# Patient Record
Sex: MALE | Race: WHITE
[De-identification: names, ages, dates, MRNs, and addresses within clinical notes are randomized per-mention and may not be internally consistent; named-entity substitution may affect disease eponyms.]

---

## 2019-11-27 ENCOUNTER — HOSPITAL ENCOUNTER (INPATIENT)
Dept: HOSPITAL 8 - ED | Age: 54
LOS: 3 days | Discharge: HOME | DRG: 246 | End: 2019-11-30
Attending: INTERNAL MEDICINE | Admitting: HOSPITALIST
Payer: COMMERCIAL

## 2019-11-27 VITALS — BODY MASS INDEX: 27.76 KG/M2 | WEIGHT: 216.27 LBS | HEIGHT: 74 IN

## 2019-11-27 VITALS — DIASTOLIC BLOOD PRESSURE: 88 MMHG | SYSTOLIC BLOOD PRESSURE: 131 MMHG

## 2019-11-27 DIAGNOSIS — Z87.891: ICD-10-CM
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
## 2019-11-27 DIAGNOSIS — E87.2: ICD-10-CM
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
## 2019-11-27 DIAGNOSIS — I25.2: ICD-10-CM

## 2019-11-27 DIAGNOSIS — Z82.49: ICD-10-CM

## 2019-11-27 DIAGNOSIS — I49.01: ICD-10-CM

## 2019-11-27 DIAGNOSIS — N17.9: ICD-10-CM

## 2019-11-27 DIAGNOSIS — I49.02: ICD-10-CM

## 2019-11-27 DIAGNOSIS — I46.2: ICD-10-CM

## 2019-11-27 DIAGNOSIS — Z82.0: ICD-10-CM

## 2019-11-27 DIAGNOSIS — K72.00: ICD-10-CM

## 2019-11-27 DIAGNOSIS — I21.09: ICD-10-CM

## 2019-11-27 DIAGNOSIS — E78.5: ICD-10-CM

## 2019-11-27 DIAGNOSIS — T82.867A: Primary | ICD-10-CM

## 2019-11-27 DIAGNOSIS — I25.10: ICD-10-CM

## 2019-11-27 DIAGNOSIS — R73.9: ICD-10-CM

## 2019-11-27 LAB
<PLATELET ESTIMATE>: ADEQUATE
<PLT MORPHOLOGY>: (no result)
ALBUMIN SERPL-MCNC: 3.9 G/DL (ref 3.4–5)
ALP SERPL-CCNC: 66 U/L (ref 45–117)
ALT SERPL-CCNC: 341 U/L (ref 12–78)
ANION GAP SERPL CALC-SCNC: 16 MMOL/L (ref 5–15)
APTT BLD: 26 SECONDS (ref 25–31)
BILIRUB SERPL-MCNC: 1.1 MG/DL (ref 0.2–1)
CALCIUM SERPL-MCNC: 8.7 MG/DL (ref 8.5–10.1)
CHLORIDE SERPL-SCNC: 108 MMOL/L (ref 98–107)
CREAT SERPL-MCNC: 1.56 MG/DL (ref 0.7–1.3)
EOS#(MANUAL): 0.08 X10^3/UL (ref 0–0.4)
EOS% (MANUAL): 1 % (ref 1–7)
ERYTHROCYTE [DISTWIDTH] IN BLOOD BY AUTOMATED COUNT: 13.3 % (ref 9.4–14.8)
INR PPP: 1.05 (ref 0.93–1.1)
LYMPH#(MANUAL): 5.06 X10^3/UL (ref 1–3.4)
LYMPHS% (MANUAL): 64 % (ref 22–44)
MCH RBC QN AUTO: 32.1 PG (ref 27.5–34.5)
MCHC RBC AUTO-ENTMCNC: 33.3 G/DL (ref 33.2–36.2)
MCV RBC AUTO: 96.6 FL (ref 81–97)
MD: YES
MONOS#(MANUAL): 0.16 X10^3/UL (ref 0.3–2.7)
MONOS% (MANUAL): 2 % (ref 2–9)
O2 FLOW: 10 L/MIN
PLATELET # BLD AUTO: 200 X10^3/UL (ref 130–400)
PMV BLD AUTO: 8.2 FL (ref 7.4–10.4)
POLYCHROMASIA BLD QL SMEAR: (no result)
PROT SERPL-MCNC: 7.3 G/DL (ref 6.4–8.2)
PROTHROMBIN TIME: 11 SECONDS (ref 9.6–11.5)
RBC # BLD AUTO: 5.01 X10^6/UL (ref 4.38–5.82)
REACTIVE LYMPHS # (MANUAL): 1.98 X10^3/UL (ref 0–0)
REACTIVE LYMPHS % (MANUAL): 25 % (ref 0–0)
SEG#(MANUAL): 0.63 X10^3/UL (ref 1.8–6.8)
SEGS% (MANUAL): 8 % (ref 42–75)
TROPONIN I SERPL-MCNC: 5.28 NG/ML (ref 0–0.04)
TROPONIN I SERPL-MCNC: 7.5 NG/ML (ref 0–0.04)

## 2019-11-27 PROCEDURE — 85730 THROMBOPLASTIN TIME PARTIAL: CPT

## 2019-11-27 PROCEDURE — 99157 MOD SED OTHER PHYS/QHP EA: CPT

## 2019-11-27 PROCEDURE — 0399T: CPT

## 2019-11-27 PROCEDURE — C1760 CLOSURE DEV, VASC: HCPCS

## 2019-11-27 PROCEDURE — 80053 COMPREHEN METABOLIC PANEL: CPT

## 2019-11-27 PROCEDURE — 85610 PROTHROMBIN TIME: CPT

## 2019-11-27 PROCEDURE — 82803 BLOOD GASES ANY COMBINATION: CPT

## 2019-11-27 PROCEDURE — 85025 COMPLETE CBC W/AUTO DIFF WBC: CPT

## 2019-11-27 PROCEDURE — 36415 COLL VENOUS BLD VENIPUNCTURE: CPT

## 2019-11-27 PROCEDURE — 87081 CULTURE SCREEN ONLY: CPT

## 2019-11-27 PROCEDURE — C1769 GUIDE WIRE: HCPCS

## 2019-11-27 PROCEDURE — 4A023N7 MEASUREMENT OF CARDIAC SAMPLING AND PRESSURE, LEFT HEART, PERCUTANEOUS APPROACH: ICD-10-PCS | Performed by: INTERNAL MEDICINE

## 2019-11-27 PROCEDURE — 99156 MOD SED OTH PHYS/QHP 5/>YRS: CPT

## 2019-11-27 PROCEDURE — 84100 ASSAY OF PHOSPHORUS: CPT

## 2019-11-27 PROCEDURE — 80048 BASIC METABOLIC PNL TOTAL CA: CPT

## 2019-11-27 PROCEDURE — B2151ZZ FLUOROSCOPY OF LEFT HEART USING LOW OSMOLAR CONTRAST: ICD-10-PCS | Performed by: INTERNAL MEDICINE

## 2019-11-27 PROCEDURE — 93306 TTE W/DOPPLER COMPLETE: CPT

## 2019-11-27 PROCEDURE — 93458 L HRT ARTERY/VENTRICLE ANGIO: CPT

## 2019-11-27 PROCEDURE — 36600 WITHDRAWAL OF ARTERIAL BLOOD: CPT

## 2019-11-27 PROCEDURE — 93005 ELECTROCARDIOGRAM TRACING: CPT

## 2019-11-27 PROCEDURE — 83735 ASSAY OF MAGNESIUM: CPT

## 2019-11-27 PROCEDURE — 84484 ASSAY OF TROPONIN QUANT: CPT

## 2019-11-27 PROCEDURE — 5A2204Z RESTORATION OF CARDIAC RHYTHM, SINGLE: ICD-10-PCS | Performed by: EMERGENCY MEDICINE

## 2019-11-27 PROCEDURE — C1725 CATH, TRANSLUMIN NON-LASER: HCPCS

## 2019-11-27 PROCEDURE — 83605 ASSAY OF LACTIC ACID: CPT

## 2019-11-27 PROCEDURE — B2111ZZ FLUOROSCOPY OF MULTIPLE CORONARY ARTERIES USING LOW OSMOLAR CONTRAST: ICD-10-PCS | Performed by: INTERNAL MEDICINE

## 2019-11-27 PROCEDURE — 80061 LIPID PANEL: CPT

## 2019-11-27 PROCEDURE — C1894 INTRO/SHEATH, NON-LASER: HCPCS

## 2019-11-27 PROCEDURE — C1887 CATHETER, GUIDING: HCPCS

## 2019-11-27 PROCEDURE — 027034Z DILATION OF CORONARY ARTERY, ONE ARTERY WITH DRUG-ELUTING INTRALUMINAL DEVICE, PERCUTANEOUS APPROACH: ICD-10-PCS | Performed by: INTERNAL MEDICINE

## 2019-11-27 PROCEDURE — 5A12012 PERFORMANCE OF CARDIAC OUTPUT, SINGLE, MANUAL: ICD-10-PCS | Performed by: EMERGENCY MEDICINE

## 2019-11-27 PROCEDURE — C1874 STENT, COATED/COV W/DEL SYS: HCPCS

## 2019-11-27 RX ADMIN — AMIODARONE HYDROCHLORIDE PRN MLS/HR: 50 INJECTION, SOLUTION INTRAVENOUS at 22:30
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
## 2019-11-28 LAB
ANION GAP SERPL CALC-SCNC: 8 MMOL/L (ref 5–15)
BASOPHILS # BLD AUTO: 0.02 X10^3/UL (ref 0–0.1)
BASOPHILS NFR BLD AUTO: 0 % (ref 0–1)
CALCIUM SERPL-MCNC: 8.6 MG/DL (ref 8.5–10.1)
CHLORIDE SERPL-SCNC: 111 MMOL/L (ref 98–107)
CREAT SERPL-MCNC: 1.15 MG/DL (ref 0.7–1.3)
EOSINOPHIL # BLD AUTO: 0.01 X10^3/UL (ref 0–0.4)
EOSINOPHIL NFR BLD AUTO: 0 % (ref 1–7)
ERYTHROCYTE [DISTWIDTH] IN BLOOD BY AUTOMATED COUNT: 13.3 % (ref 9.4–14.8)
LYMPHOCYTES # BLD AUTO: 1.11 X10^3/UL (ref 1–3.4)
LYMPHOCYTES NFR BLD AUTO: 15 % (ref 22–44)
MCH RBC QN AUTO: 31.8 PG (ref 27.5–34.5)
MCHC RBC AUTO-ENTMCNC: 33 G/DL (ref 33.2–36.2)
MCV RBC AUTO: 96.3 FL (ref 81–97)
MD: NO
MONOCYTES # BLD AUTO: 0.58 X10^3/UL (ref 0.2–0.8)
MONOCYTES NFR BLD AUTO: 8 % (ref 2–9)
NEUTROPHILS # BLD AUTO: 5.7 X10^3/UL (ref 1.8–6.8)
NEUTROPHILS NFR BLD AUTO: 77 % (ref 42–75)
PLATELET # BLD AUTO: 182 X10^3/UL (ref 130–400)
PMV BLD AUTO: 8.4 FL (ref 7.4–10.4)
RBC # BLD AUTO: 4.52 X10^6/UL (ref 4.38–5.82)
TROPONIN I SERPL-MCNC: 22.8 NG/ML (ref 0–0.04)
TROPONIN I SERPL-MCNC: 26.6 NG/ML (ref 0–0.04)

## 2019-11-28 RX ADMIN — SODIUM CHLORIDE AND POTASSIUM CHLORIDE SCH MLS/HR: 9; 2.98 INJECTION, SOLUTION INTRAVENOUS at 00:51

## 2019-11-28 RX ADMIN — AMIODARONE HYDROCHLORIDE PRN MLS/HR: 50 INJECTION, SOLUTION INTRAVENOUS at 03:20

## 2019-11-28 RX ADMIN — SODIUM CHLORIDE AND POTASSIUM CHLORIDE SCH MLS/HR: 9; 2.98 INJECTION, SOLUTION INTRAVENOUS at 09:12

## 2019-11-28 RX ADMIN — SODIUM CHLORIDE SCH MLS/HR: 0.9 INJECTION, SOLUTION INTRAVENOUS at 09:12

## 2019-11-28 RX ADMIN — SODIUM CHLORIDE SCH MLS/HR: 0.9 INJECTION, SOLUTION INTRAVENOUS at 00:12

## 2019-11-28 RX ADMIN — METOPROLOL TARTRATE SCH MG: 25 TABLET, FILM COATED ORAL at 08:30

## 2019-11-28 RX ADMIN — METOPROLOL TARTRATE SCH MG: 25 TABLET, FILM COATED ORAL at 17:56

## 2019-11-28 RX ADMIN — TICAGRELOR SCH MG: 90 TABLET ORAL at 19:37

## 2019-11-28 RX ADMIN — LISINOPRIL SCH MG: 5 TABLET ORAL at 09:18

## 2019-11-28 RX ADMIN — ASPIRIN SCH MG: 81 TABLET, COATED ORAL at 05:56

## 2019-11-28 RX ADMIN — LISINOPRIL SCH MG: 5 TABLET ORAL at 19:37

## 2019-11-28 RX ADMIN — TICAGRELOR SCH MG: 90 TABLET ORAL at 09:19

## 2019-11-29 VITALS — DIASTOLIC BLOOD PRESSURE: 82 MMHG | SYSTOLIC BLOOD PRESSURE: 135 MMHG

## 2019-11-29 VITALS — SYSTOLIC BLOOD PRESSURE: 110 MMHG | DIASTOLIC BLOOD PRESSURE: 71 MMHG

## 2019-11-29 LAB
ALBUMIN SERPL-MCNC: 3.5 G/DL (ref 3.4–5)
ALP SERPL-CCNC: 58 U/L (ref 45–117)
ALT SERPL-CCNC: 260 U/L (ref 12–78)
ANION GAP SERPL CALC-SCNC: 6 MMOL/L (ref 5–15)
BILIRUB SERPL-MCNC: 0.6 MG/DL (ref 0.2–1)
CALCIUM SERPL-MCNC: 8.5 MG/DL (ref 8.5–10.1)
CHLORIDE SERPL-SCNC: 113 MMOL/L (ref 98–107)
CHOL/HDL RATIO: 2.7
CREAT SERPL-MCNC: 1.12 MG/DL (ref 0.7–1.3)
HDL CHOL %: 37 % (ref 26–37)
HDL CHOLESTEROL (DIRECT): 37 MG/DL (ref 40–60)
LDL CHOLESTEROL,CALCULATED: 47 MG/DL (ref 54–169)
LDLC/HDLC SERPL: 1.3 {RATIO} (ref 0.5–3)
PROT SERPL-MCNC: 6.9 G/DL (ref 6.4–8.2)
TRIGL SERPL-MCNC: 84 MG/DL (ref 50–200)
TROPONIN I SERPL-MCNC: 13.6 NG/ML (ref 0–0.04)
VLDLC SERPL CALC-MCNC: 17 MG/DL (ref 0–25)

## 2019-11-29 RX ADMIN — TICAGRELOR SCH MG: 90 TABLET ORAL at 20:47

## 2019-11-29 RX ADMIN — ASPIRIN SCH MG: 81 TABLET, COATED ORAL at 06:06

## 2019-11-29 RX ADMIN — LISINOPRIL SCH MG: 5 TABLET ORAL at 08:38

## 2019-11-29 RX ADMIN — METOPROLOL TARTRATE SCH MG: 25 TABLET, FILM COATED ORAL at 18:00

## 2019-11-29 RX ADMIN — TICAGRELOR SCH MG: 90 TABLET ORAL at 08:38

## 2019-11-29 RX ADMIN — METOPROLOL TARTRATE SCH MG: 25 TABLET, FILM COATED ORAL at 06:00

## 2019-11-29 RX ADMIN — LISINOPRIL SCH MG: 5 TABLET ORAL at 20:47

## 2019-11-30 VITALS — SYSTOLIC BLOOD PRESSURE: 114 MMHG | DIASTOLIC BLOOD PRESSURE: 78 MMHG

## 2019-11-30 VITALS — SYSTOLIC BLOOD PRESSURE: 106 MMHG | DIASTOLIC BLOOD PRESSURE: 64 MMHG

## 2019-11-30 RX ADMIN — TICAGRELOR SCH MG: 90 TABLET ORAL at 09:07

## 2019-11-30 RX ADMIN — METOPROLOL TARTRATE SCH MG: 25 TABLET, FILM COATED ORAL at 06:00

## 2019-11-30 RX ADMIN — LISINOPRIL SCH MG: 5 TABLET ORAL at 09:07

## 2019-11-30 RX ADMIN — ASPIRIN SCH MG: 81 TABLET, COATED ORAL at 09:07
